# Patient Record
Sex: FEMALE | Race: BLACK OR AFRICAN AMERICAN | NOT HISPANIC OR LATINO | URBAN - METROPOLITAN AREA
[De-identification: names, ages, dates, MRNs, and addresses within clinical notes are randomized per-mention and may not be internally consistent; named-entity substitution may affect disease eponyms.]

---

## 2019-01-22 ENCOUNTER — OUTPATIENT (OUTPATIENT)
Dept: OUTPATIENT SERVICES | Facility: HOSPITAL | Age: 64
LOS: 1 days | End: 2019-01-22

## 2019-01-22 VITALS
RESPIRATION RATE: 16 BRPM | HEART RATE: 59 BPM | TEMPERATURE: 97 F | OXYGEN SATURATION: 97 % | SYSTOLIC BLOOD PRESSURE: 122 MMHG | HEIGHT: 64 IN | DIASTOLIC BLOOD PRESSURE: 76 MMHG | WEIGHT: 199.96 LBS

## 2019-01-22 DIAGNOSIS — M20.11 HALLUX VALGUS (ACQUIRED), RIGHT FOOT: ICD-10-CM

## 2019-01-22 DIAGNOSIS — M79.671 PAIN IN RIGHT FOOT: ICD-10-CM

## 2019-01-22 DIAGNOSIS — R00.2 PALPITATIONS: ICD-10-CM

## 2019-01-22 DIAGNOSIS — Z90.710 ACQUIRED ABSENCE OF BOTH CERVIX AND UTERUS: Chronic | ICD-10-CM

## 2019-01-22 LAB
ANION GAP SERPL CALC-SCNC: 14 MMO/L — SIGNIFICANT CHANGE UP (ref 7–14)
BUN SERPL-MCNC: 18 MG/DL — SIGNIFICANT CHANGE UP (ref 7–23)
CALCIUM SERPL-MCNC: 10 MG/DL — SIGNIFICANT CHANGE UP (ref 8.4–10.5)
CHLORIDE SERPL-SCNC: 98 MMOL/L — SIGNIFICANT CHANGE UP (ref 98–107)
CO2 SERPL-SCNC: 31 MMOL/L — SIGNIFICANT CHANGE UP (ref 22–31)
CREAT SERPL-MCNC: 1.1 MG/DL — SIGNIFICANT CHANGE UP (ref 0.5–1.3)
GLUCOSE SERPL-MCNC: 78 MG/DL — SIGNIFICANT CHANGE UP (ref 70–99)
HCT VFR BLD CALC: 42.4 % — SIGNIFICANT CHANGE UP (ref 34.5–45)
HGB BLD-MCNC: 13 G/DL — SIGNIFICANT CHANGE UP (ref 11.5–15.5)
MCHC RBC-ENTMCNC: 28.1 PG — SIGNIFICANT CHANGE UP (ref 27–34)
MCHC RBC-ENTMCNC: 30.7 % — LOW (ref 32–36)
MCV RBC AUTO: 91.8 FL — SIGNIFICANT CHANGE UP (ref 80–100)
NRBC # FLD: 0 K/UL — LOW (ref 25–125)
PLATELET # BLD AUTO: 317 K/UL — SIGNIFICANT CHANGE UP (ref 150–400)
PMV BLD: 10.4 FL — SIGNIFICANT CHANGE UP (ref 7–13)
POTASSIUM SERPL-MCNC: 3.2 MMOL/L — LOW (ref 3.5–5.3)
POTASSIUM SERPL-SCNC: 3.2 MMOL/L — LOW (ref 3.5–5.3)
RBC # BLD: 4.62 M/UL — SIGNIFICANT CHANGE UP (ref 3.8–5.2)
RBC # FLD: 14.6 % — HIGH (ref 10.3–14.5)
SODIUM SERPL-SCNC: 143 MMOL/L — SIGNIFICANT CHANGE UP (ref 135–145)
WBC # BLD: 4.16 K/UL — SIGNIFICANT CHANGE UP (ref 3.8–10.5)
WBC # FLD AUTO: 4.16 K/UL — SIGNIFICANT CHANGE UP (ref 3.8–10.5)

## 2019-01-22 RX ORDER — ATORVASTATIN CALCIUM 80 MG/1
1 TABLET, FILM COATED ORAL
Qty: 0 | Refills: 0 | COMMUNITY

## 2019-01-22 NOTE — H&P PST ADULT - RESPIRATORY AND THORAX COMMENTS
asthma - no recent exacerbations, has a rescue inhaler at home but does not use it, hospitalized 20 years ago

## 2019-01-22 NOTE — H&P PST ADULT - RS GEN PE MLT RESP DETAILS PC
no rales/airway patent/clear to auscultation bilaterally/good air movement/breath sounds equal/no rhonchi/no wheezes/respirations non-labored

## 2019-01-22 NOTE — H&P PST ADULT - PROBLEM SELECTOR PLAN 1
Pt is scheduled for right foot susi bunionectomy with cheilectomy for 2/8/19. Pre-op instructions provided. Pepcid provided for GI prophylaxis. Pt stated understanding.    FRACISCO precautions, OR booking notified

## 2019-01-22 NOTE — H&P PST ADULT - PROBLEM SELECTOR PLAN 2
Pending cardiologist evaluation. Pt had appt on 1/14/19. Pending ekg from 1/14/19 with last echo and stress test.

## 2019-01-22 NOTE — H&P PST ADULT - NEGATIVE ENMT SYMPTOMS
no hearing difficulty/no nose bleeds/no ear pain/no tinnitus/no sinus symptoms/no throat pain/no dysphagia

## 2019-01-22 NOTE — H&P PST ADULT - PMH
Anxiety and depression    Asthma    Hallux rigidus of right foot    Hallux valgus (acquired), right foot    HLD (hyperlipidemia)    HTN (hypertension)    Pain in right foot    Palpitations

## 2019-01-22 NOTE — H&P PST ADULT - NEGATIVE NEUROLOGICAL SYMPTOMS
no tremors/no paresthesias/no transient paralysis/no syncope/no weakness/no focal seizures/no generalized seizures/no difficulty walking

## 2019-01-22 NOTE — H&P PST ADULT - HISTORY OF PRESENT ILLNESS
63 year old female presents to presurgical testing with diagnosis of hallux valgus acquired right foot, hallux rigidus right foot, pain in right foot scheduled for right foot susi bunionectomy with cheilectomy for 2/8/19. Pt reports right bunion deformity and discomfort is getting worse despite conservative management.

## 2019-01-22 NOTE — H&P PST ADULT - NSANTHOSAYNRD_GEN_A_CORE
No. FRACISCO screening performed.  STOP BANG Legend: 0-2 = LOW Risk; 3-4 = INTERMEDIATE Risk; 5-8 = HIGH Risk

## 2019-02-08 ENCOUNTER — OUTPATIENT (OUTPATIENT)
Dept: OUTPATIENT SERVICES | Facility: HOSPITAL | Age: 64
LOS: 1 days | Discharge: HOME | End: 2019-02-08
Payer: COMMERCIAL

## 2019-02-08 VITALS
DIASTOLIC BLOOD PRESSURE: 76 MMHG | OXYGEN SATURATION: 98 % | HEART RATE: 58 BPM | RESPIRATION RATE: 14 BRPM | SYSTOLIC BLOOD PRESSURE: 115 MMHG | HEIGHT: 64 IN | TEMPERATURE: 99 F | WEIGHT: 199.96 LBS

## 2019-02-08 VITALS
HEART RATE: 57 BPM | DIASTOLIC BLOOD PRESSURE: 68 MMHG | OXYGEN SATURATION: 100 % | SYSTOLIC BLOOD PRESSURE: 122 MMHG | TEMPERATURE: 98 F

## 2019-02-08 DIAGNOSIS — M79.671 PAIN IN RIGHT FOOT: ICD-10-CM

## 2019-02-08 DIAGNOSIS — Z90.710 ACQUIRED ABSENCE OF BOTH CERVIX AND UTERUS: Chronic | ICD-10-CM

## 2019-02-08 PROCEDURE — 73630 X-RAY EXAM OF FOOT: CPT | Mod: 26,RT

## 2019-02-08 RX ORDER — ASPIRIN/CALCIUM CARB/MAGNESIUM 324 MG
1 TABLET ORAL
Qty: 0 | Refills: 0 | COMMUNITY

## 2019-02-08 RX ORDER — CHLORTHALIDONE 50 MG
1 TABLET ORAL
Qty: 0 | Refills: 0 | COMMUNITY

## 2019-02-08 RX ORDER — ATORVASTATIN CALCIUM 80 MG/1
1 TABLET, FILM COATED ORAL
Qty: 0 | Refills: 0 | COMMUNITY

## 2019-02-08 NOTE — ASU DISCHARGE PLAN (ADULT/PEDIATRIC). - NOTIFY
Bleeding that does not stop/Pain not relieved by Medications/Fever greater than 101/Numbness, tingling/Numbness, color, or temperature change to extremity/Persistent Nausea and Vomiting/Swelling that continues

## 2019-02-08 NOTE — ASU DISCHARGE PLAN (ADULT/PEDIATRIC). - MEDICATION SUMMARY - MEDICATIONS TO TAKE
I will START or STAY ON the medications listed below when I get home from the hospital:    aspirin 81 mg oral tablet  -- 1 tab(s) by mouth once a day last dose 1/22/2019  -- Indication: For Right foot pain    atorvastatin 10 mg oral tablet  -- 1 tab(s) by mouth once a day  -- Indication: For Right foot pain    chlorthalidone 25 mg oral tablet  -- 1 tab(s) by mouth once a day am  -- Indication: For Right foot pain

## 2019-02-08 NOTE — ASU DISCHARGE PLAN (ADULT/PEDIATRIC). - ASU FOLLOWUP
911 or go to the nearest Emergency Room [facility] ([facility]):

## 2022-04-05 NOTE — ASU PREOP CHECKLIST - SELECT TESTS ORDERED
Epidural Block  Performed by: Susan Gross RN  Authorized by: Antelmo Mayers MD            EKG/BMP/CBC BMP/EKG/repeat k+ 4.3/CBC